# Patient Record
Sex: MALE | Race: WHITE | NOT HISPANIC OR LATINO | URBAN - METROPOLITAN AREA
[De-identification: names, ages, dates, MRNs, and addresses within clinical notes are randomized per-mention and may not be internally consistent; named-entity substitution may affect disease eponyms.]

---

## 2018-12-23 ENCOUNTER — EMERGENCY (EMERGENCY)
Facility: HOSPITAL | Age: 58
LOS: 1 days | Discharge: HOME | End: 2018-12-23
Attending: EMERGENCY MEDICINE
Payer: COMMERCIAL

## 2018-12-23 VITALS
HEART RATE: 66 BPM | HEIGHT: 72 IN | DIASTOLIC BLOOD PRESSURE: 96 MMHG | OXYGEN SATURATION: 98 % | TEMPERATURE: 98 F | WEIGHT: 171.96 LBS | SYSTOLIC BLOOD PRESSURE: 160 MMHG | RESPIRATION RATE: 17 BRPM

## 2018-12-23 VITALS
TEMPERATURE: 98 F | DIASTOLIC BLOOD PRESSURE: 84 MMHG | HEART RATE: 62 BPM | OXYGEN SATURATION: 98 % | SYSTOLIC BLOOD PRESSURE: 124 MMHG | RESPIRATION RATE: 18 BRPM

## 2018-12-23 LAB
ALBUMIN SERPL ELPH-MCNC: 4.6 G/DL — SIGNIFICANT CHANGE UP (ref 3.3–5)
ALP SERPL-CCNC: 61 U/L — SIGNIFICANT CHANGE UP (ref 40–120)
ALT FLD-CCNC: 18 U/L — SIGNIFICANT CHANGE UP (ref 10–45)
ANION GAP SERPL CALC-SCNC: 11 MMOL/L — SIGNIFICANT CHANGE UP (ref 5–17)
AST SERPL-CCNC: 18 U/L — SIGNIFICANT CHANGE UP (ref 10–40)
BASOPHILS # BLD AUTO: 0 K/UL — SIGNIFICANT CHANGE UP (ref 0–0.2)
BASOPHILS NFR BLD AUTO: 0.7 % — SIGNIFICANT CHANGE UP (ref 0–2)
BILIRUB SERPL-MCNC: 0.4 MG/DL — SIGNIFICANT CHANGE UP (ref 0.2–1.2)
BUN SERPL-MCNC: 26 MG/DL — HIGH (ref 7–23)
CALCIUM SERPL-MCNC: 9.5 MG/DL — SIGNIFICANT CHANGE UP (ref 8.4–10.5)
CHLORIDE SERPL-SCNC: 102 MMOL/L — SIGNIFICANT CHANGE UP (ref 96–108)
CO2 SERPL-SCNC: 27 MMOL/L — SIGNIFICANT CHANGE UP (ref 22–31)
CREAT SERPL-MCNC: 0.95 MG/DL — SIGNIFICANT CHANGE UP (ref 0.5–1.3)
EOSINOPHIL # BLD AUTO: 0.1 K/UL — SIGNIFICANT CHANGE UP (ref 0–0.5)
EOSINOPHIL NFR BLD AUTO: 0.9 % — SIGNIFICANT CHANGE UP (ref 0–6)
ERYTHROCYTE [SEDIMENTATION RATE] IN BLOOD: 8 MM/HR — SIGNIFICANT CHANGE UP (ref 0–20)
GLUCOSE SERPL-MCNC: 109 MG/DL — HIGH (ref 70–99)
HCT VFR BLD CALC: 47.8 % — SIGNIFICANT CHANGE UP (ref 39–50)
HGB BLD-MCNC: 16.2 G/DL — SIGNIFICANT CHANGE UP (ref 13–17)
LYMPHOCYTES # BLD AUTO: 1.4 K/UL — SIGNIFICANT CHANGE UP (ref 1–3.3)
LYMPHOCYTES # BLD AUTO: 18.8 % — SIGNIFICANT CHANGE UP (ref 13–44)
MCHC RBC-ENTMCNC: 33.6 PG — SIGNIFICANT CHANGE UP (ref 27–34)
MCHC RBC-ENTMCNC: 34 GM/DL — SIGNIFICANT CHANGE UP (ref 32–36)
MCV RBC AUTO: 99 FL — SIGNIFICANT CHANGE UP (ref 80–100)
MONOCYTES # BLD AUTO: 0.7 K/UL — SIGNIFICANT CHANGE UP (ref 0–0.9)
MONOCYTES NFR BLD AUTO: 9.8 % — SIGNIFICANT CHANGE UP (ref 2–14)
NEUTROPHILS # BLD AUTO: 5 K/UL — SIGNIFICANT CHANGE UP (ref 1.8–7.4)
NEUTROPHILS NFR BLD AUTO: 69.8 % — SIGNIFICANT CHANGE UP (ref 43–77)
PLATELET # BLD AUTO: 240 K/UL — SIGNIFICANT CHANGE UP (ref 150–400)
POTASSIUM SERPL-MCNC: 3.7 MMOL/L — SIGNIFICANT CHANGE UP (ref 3.5–5.3)
POTASSIUM SERPL-SCNC: 3.7 MMOL/L — SIGNIFICANT CHANGE UP (ref 3.5–5.3)
PROT SERPL-MCNC: 7.3 G/DL — SIGNIFICANT CHANGE UP (ref 6–8.3)
RBC # BLD: 4.83 M/UL — SIGNIFICANT CHANGE UP (ref 4.2–5.8)
RBC # FLD: 11.1 % — SIGNIFICANT CHANGE UP (ref 10.3–14.5)
SODIUM SERPL-SCNC: 140 MMOL/L — SIGNIFICANT CHANGE UP (ref 135–145)
WBC # BLD: 7.2 K/UL — SIGNIFICANT CHANGE UP (ref 3.8–10.5)
WBC # FLD AUTO: 7.2 K/UL — SIGNIFICANT CHANGE UP (ref 3.8–10.5)

## 2018-12-23 PROCEDURE — 85027 COMPLETE CBC AUTOMATED: CPT

## 2018-12-23 PROCEDURE — 99284 EMERGENCY DEPT VISIT MOD MDM: CPT

## 2018-12-23 PROCEDURE — 86140 C-REACTIVE PROTEIN: CPT

## 2018-12-23 PROCEDURE — 73660 X-RAY EXAM OF TOE(S): CPT

## 2018-12-23 PROCEDURE — 80053 COMPREHEN METABOLIC PANEL: CPT

## 2018-12-23 PROCEDURE — 73660 X-RAY EXAM OF TOE(S): CPT | Mod: 26,LT

## 2018-12-23 PROCEDURE — 87070 CULTURE OTHR SPECIMN AEROBIC: CPT

## 2018-12-23 PROCEDURE — 85652 RBC SED RATE AUTOMATED: CPT

## 2018-12-23 RX ORDER — PIPERACILLIN AND TAZOBACTAM 4; .5 G/20ML; G/20ML
3.38 INJECTION, POWDER, LYOPHILIZED, FOR SOLUTION INTRAVENOUS ONCE
Qty: 0 | Refills: 0 | Status: DISCONTINUED | OUTPATIENT
Start: 2018-12-23 | End: 2018-12-23

## 2018-12-23 RX ORDER — VANCOMYCIN HCL 1 G
1000 VIAL (EA) INTRAVENOUS ONCE
Qty: 0 | Refills: 0 | Status: DISCONTINUED | OUTPATIENT
Start: 2018-12-23 | End: 2018-12-23

## 2018-12-23 RX ORDER — AZTREONAM 2 G
2 VIAL (EA) INJECTION
Qty: 20 | Refills: 0 | OUTPATIENT
Start: 2018-12-23 | End: 2018-12-27

## 2018-12-23 RX ORDER — CIPROFLOXACIN LACTATE 400MG/40ML
1 VIAL (ML) INTRAVENOUS
Qty: 10 | Refills: 0 | OUTPATIENT
Start: 2018-12-23 | End: 2018-12-27

## 2018-12-23 NOTE — CONSULT NOTE ADULT - SUBJECTIVE AND OBJECTIVE BOX
Patient is a 58y old  Male who presents with a chief complaint of L foot ulcer     HPI: 59yo M with PMH HTN presenting with L third toe infection x 3 days. Pt reports ulcer to left third toe (hammer toe) that was "shaved" by podiatrist and pt started on amoxicillin 3 days ago. Patient reports worsening redness of toe as well as purulent drainage, so went to  last night and had abx changed to clinda as well as given topical creams to apply. Pt reports overall his toe is improved from 3 days ago, however came to ED for evaluation because he still does not like the way toe looks and podiatrist is on vacation. Able to ambulate. Denies any toe pain, spreading redness, or numbness/tingling. Denies fever/chills, n/v, h/o DM.       PAST MEDICAL & SURGICAL HISTORY:  HTN (hypertension)      MEDICATIONS  (STANDING):    MEDICATIONS  (PRN):      Allergies    No Known Allergies    Intolerances        VITALS:    Vital Signs Last 24 Hrs  T(C): 36.6 (23 Dec 2018 19:46), Max: 36.9 (23 Dec 2018 17:35)  T(F): 97.9 (23 Dec 2018 19:46), Max: 98.4 (23 Dec 2018 17:35)  HR: 57 (23 Dec 2018 19:46) (57 - 66)  BP: 136/84 (23 Dec 2018 19:46) (136/84 - 160/96)  BP(mean): --  RR: 18 (23 Dec 2018 19:46) (17 - 18)  SpO2: 97% (23 Dec 2018 19:46) (97% - 98%)    LABS:                          16.2   7.2   )-----------( 240      ( 23 Dec 2018 18:54 )             47.8       12-23    140  |  102  |  26<H>  ----------------------------<  109<H>  3.7   |  27  |  0.95    Ca    9.5      23 Dec 2018 18:54    TPro  7.3  /  Alb  4.6  /  TBili  0.4  /  DBili  x   /  AST  18  /  ALT  18  /  AlkPhos  61  12-23      CAPILLARY BLOOD GLUCOSE              LOWER EXTREMITY PHYSICAL EXAM:    Vasular: DP 1/4, /PT 2/4, B/L, CFT < 3 seconds B/L, Temperature gradient wnl, B/L.   Neuro: Epicritic sensation intact to the level of toes, B/L.  Musculoskeletal/Ortho: cavus foot b/l, flexible hammertoes lesser digits b/l   Skin: no edema, no erythema, no acute signs of infection   Wound #1:   Location: distal tip 3rd toe Left foot  Size: 0.4 x 0.4 x 0.1  Depth: subQ   Wound bed: granular  Drainage: sanguinous  Odor: none   Periwound: hyperkeratotic   Etiology: pressure     RADIOLOGY & ADDITIONAL STUDIES:  < from: Xray Toes, Left Foot (12.23.18 @ 19:06) >    ******PRELIMINARY REPORT******    ******PRELIMINARY REPORT******          EXAM:  TOES(S) LEFT FOOT                            PROCEDURE DATE:  12/23/2018      ******PRELIMINARY REPORT******    ******PRELIMINARY REPORT******              INTERPRETATION:  Third digit distal phalynx soft tissue swelling, focal   osteopenia, and destructive erosion of the cortex and portions of the   medullary cavity. Findings compatible with osteomyelitis.              ******PRELIMINARY REPORT******    ******PRELIMINARY REPORT******          BEATRIZ BRYAN M.D., RADIOLOGY RESIDENT                      < end of copied text >

## 2018-12-23 NOTE — ED ADULT NURSE NOTE - OBJECTIVE STATEMENT
57 y/o male with PMH HTN presenting to ED for left foot 3rd toe discomfort x 2 days. Pt states " I see a podiatrist for this toe. It's a hammer toe and a frequent issue. They debrided it and put me on an antibiotic recently. On saturday, I went to urgent care and they gave me clindamycin. Since I started the antibiotics and creams, the toe looks better but it's still a little uncomfortable." Upon exam pt A&Ox3 gross neuro intact,  no difficulty speaking in complete sentences, toe pink,  cap refill <2 seconds, no loss in sensation +rom in all toes, +pedal pulses, not painful to touch. Pt denies chest pain, sob, ha, n/v/d, abdominal pain, f/c, urinary symptoms, hematuria. +ambulation

## 2018-12-23 NOTE — ED PROVIDER NOTE - SKIN WOUND TYPE
+ small 0.5cm ulcer to tip of left third toe with erythematous base, + minimal drainage, diffuse mild edema and erythema to third toe, no spreading redness or streaking to foot, no ankle edema, sensation intact, DP pulse 2+, + hypertrophic toe nails + small 0.5cm ulcer to tip of left third toe with erythematous base, + minimal drainage, diffuse mild edema and erythema to distal third toe, no spreading redness or streaking to foot, no warmth, no ankle edema, sensation intact, DP pulse 2+, + hypertrophic toe nails

## 2018-12-23 NOTE — ED PROVIDER NOTE - CARE PLAN
Principal Discharge DX:	Toe infection  Assessment and plan of treatment:	1. Rest and elevate affected area. Stay well hydrated.   Take Motrin 600mg every 8 hours as needed for pain. Take with food.  2. Take clindamycin to completion as prescribed.   3. Follow up with your podiatrist, Dr. Rojas, and primary care provider upon discharge.    4. Return to ER for any worsening redness, swelling, streaking (red lines), fever, chills, purulent drainage, vomiting, or any other concerning symptoms.

## 2018-12-23 NOTE — ED PROVIDER NOTE - MEDICAL DECISION MAKING DETAILS
57yo M with HTN and left 3rd toe infection for couple of days. Seen by podiatrist, started on amoxicillin which was changed by first med to clindamycin. No fever, no chills. Came to ER to be checked. Seen by podiatry in the ER, wound looks good, sed rate is 8, xray revealed small irregularity concerning for osteomyelitis. According to podiatry, patient can home to continue clindamycin and f/u with Dr. Rojas at scheduled appointment on 1/3/19. ZR

## 2018-12-23 NOTE — ED PROVIDER NOTE - OBJECTIVE STATEMENT
59yo M with PMH HTN presenting with L third toe infection x 3 days. Pt reports ulcer to left third toe that was "shaved" by podiatrist and pt started on amoxicillin 3 days ago. Patient reports worsening redness of toe as well as purulent drainage, so went to  last night and had abx changed to clinda as well as given topical creams to apply. Pt reports overall toe looks better than it did 3 days ago, however came to ED for evaluation because he does not like the way toe looks and podiatrist is on vacation. Able to ambulate. Denies any toe pain, spreading redness, or numbness/tingling. Denies fever/chills, 59yo M with PMH HTN presenting with L third toe infection x 3 days. Pt reports ulcer to left third toe that was "shaved" by podiatrist and pt started on amoxicillin 3 days ago. Patient reports worsening redness of toe as well as purulent drainage, so went to  last night and had abx changed to clinda as well as given topical creams to apply. Pt reports overall toe looks better than it did 3 days ago, however came to ED for evaluation because he does not like the way toe looks and podiatrist is on vacation. Able to ambulate. Denies any toe pain, spreading redness, or numbness/tingling. Denies fever/chills  podiatrist Samuel Rojas 59yo M with PMH HTN presenting with L third toe infection x 3 days. Pt reports ulcer to left third toe (hammer toe) that was "shaved" by podiatrist and pt started on amoxicillin 3 days ago. Patient reports worsening redness of toe as well as purulent drainage, so went to  last night and had abx changed to clinda as well as given topical creams to apply. Pt reports overall toe looks better than it did 3 days ago, however came to ED for evaluation because he does not like the way toe looks and podiatrist is on vacation. Able to ambulate. Denies any toe pain, spreading redness, or numbness/tingling. Denies fever/chills, n/v, h/o DM.     Podiatrist SUKH Rojas  podiatrist Samuel Rojas 57yo M with PMH HTN presenting with L third toe infection x 3 days. Pt reports ulcer to left third toe (hammer toe) that was "shaved" by podiatrist and pt started on amoxicillin 3 days ago. Patient reports worsening redness of toe as well as purulent drainage, so went to  last night and had abx changed to clinda as well as given topical creams to apply. Pt reports overall his toe is improved from 3 days ago, however came to ED for evaluation because he still does not like the way toe looks and podiatrist is on vacation. Able to ambulate. Denies any toe pain, spreading redness, or numbness/tingling. Denies fever/chills, n/v, h/o DM.     Podiatrist SUKH Rojas  podiatrist Samuel Rojas 59yo M with PMH HTN presenting with L third toe infection x 3 days. Pt reports ulcer to left third toe (hammer toe) that was "shaved" by podiatrist and pt started on amoxicillin 3 days ago. Patient reports worsening redness of toe as well as purulent drainage, so went to  last night and had abx changed to clinda as well as given topical creams to apply. Pt reports overall his toe is improved from 3 days ago, however came to ED for evaluation because he still does not like the way toe looks and podiatrist is on vacation. Able to ambulate. Denies any toe pain, spreading redness, or numbness/tingling. Denies fever/chills, n/v, h/o DM.     Podiatrist Hira Rojas  [facility]

## 2018-12-23 NOTE — ED ADULT NURSE NOTE - NSIMPLEMENTINTERV_GEN_ALL_ED
Implemented All Universal Safety Interventions:  [place] to call system. Call bell, personal items and telephone within reach. Instruct patient to call for assistance. Room bathroom lighting operational. Non-slip footwear when patient is off stretcher. Physically safe environment: no spills, clutter or unnecessary equipment. Stretcher in lowest position, wheels locked, appropriate side rails in place.

## 2018-12-23 NOTE — ED ADULT TRIAGE NOTE - CHIEF COMPLAINT QUOTE
left 3rd toe redness/swelling and drainage- patient seen by podiatrist (Dr. Hira Rojas) for 2 days ago

## 2018-12-23 NOTE — ED PROVIDER NOTE - NSFOLLOWUPINSTRUCTIONS_ED_ALL_ED_FT
1. Rest and elevate affected area. Stay well hydrated.   Take Motrin 600mg every 8 hours as needed for pain. Take with food.  2. Take clindamycin to completion as prescribed.   3. Follow up with your podiatrist, Dr. Rojas, and primary care provider upon discharge.    4. Return to ER for any worsening redness, swelling, streaking (red lines), fever, chills, purulent drainage, vomiting, or any other concerning symptoms.

## 2018-12-23 NOTE — CONSULT NOTE ADULT - ASSESSMENT
57 y/o Male w/ Left 3rd toe distal ulcer to subQ   -Pt seen and evaluated   -vitals stable, WBC 7.12, ESR 8, CRP pending   -excisional debridement of ulcer with #15 blade to subQ   -no purulence, no erythema, no acute signs of infection   -Wound cleaned with saline & cultured   -dressed w/ betadine + DSD   -dispensed surgical shoe   -wound does not probe to bone   -xray reviewed; wound is stable to subQ - pt does not need admission  -will perform outpatient workup for OM  -Rx Cipro x 1 week   -Instructed pt to continue taking course of Clinda   -Daily dressing changes w/ mupirocin + DSD   -follow up Next week 12/31 with Dr Genao at [facility] [address]. Call [phone] to schedule appointment   -discussed w/ attending who is in agreement

## 2018-12-23 NOTE — ED PROVIDER NOTE - PROGRESS NOTE DETAILS
Pt still pending podiatry eval and recs, podiatry aware of Xray concerning for OM. - Lizeth Freeman PA-C Patient evaluated by podiatry resident in ED and Xrays reviewed concerning for OM. Podiatry NOT concerned for OM at this time, and does not want further imaging. ESR is 8. Podiatry recommending that patient be d/c to continue clinda course and f/u at scheduled appointment with Dr. Rojas on 1/3/19. - Lizeth Freeman PA-C

## 2018-12-23 NOTE — ED PROVIDER NOTE - CARE PROVIDER_API CALL
Hira Rojas (TRUDI), Foot Surgery  [address] [place] [address]  Phone: [phone]  Fax: [phone]

## 2018-12-24 PROBLEM — Z00.00 ENCOUNTER FOR PREVENTIVE HEALTH EXAMINATION: Status: ACTIVE | Noted: 2018-12-24

## 2018-12-24 LAB — CRP SERPL-MCNC: <0.1 MG/DL — SIGNIFICANT CHANGE UP (ref 0–0.4)

## 2018-12-25 LAB
CULTURE RESULTS: SIGNIFICANT CHANGE UP
SPECIMEN SOURCE: SIGNIFICANT CHANGE UP

## 2018-12-31 ENCOUNTER — APPOINTMENT (OUTPATIENT)
Dept: WOUND CARE | Facility: CLINIC | Age: 58
End: 2018-12-31
Payer: COMMERCIAL

## 2018-12-31 DIAGNOSIS — Z86.79 PERSONAL HISTORY OF OTHER DISEASES OF THE CIRCULATORY SYSTEM: ICD-10-CM

## 2018-12-31 PROCEDURE — 99203 OFFICE O/P NEW LOW 30 MIN: CPT | Mod: 25

## 2018-12-31 PROCEDURE — 11042 DBRDMT SUBQ TIS 1ST 20SQCM/<: CPT

## 2019-01-07 ENCOUNTER — APPOINTMENT (OUTPATIENT)
Dept: WOUND CARE | Facility: CLINIC | Age: 59
End: 2019-01-07
Payer: COMMERCIAL

## 2019-01-07 VITALS — TEMPERATURE: 98.1 F | DIASTOLIC BLOOD PRESSURE: 92 MMHG | SYSTOLIC BLOOD PRESSURE: 153 MMHG | HEART RATE: 84 BPM

## 2019-01-07 DIAGNOSIS — Q66.7 CONGENITAL PES CAVUS: ICD-10-CM

## 2019-01-07 PROCEDURE — 99213 OFFICE O/P EST LOW 20 MIN: CPT

## 2019-01-07 RX ORDER — HYDRALAZINE/HYDROCHLOROTHIAZID 50 MG-50MG
50-50 CAPSULE ORAL TWICE DAILY
Refills: 0 | Status: ACTIVE | COMMUNITY

## 2019-01-07 RX ORDER — NIFEDIPINE 90 MG
TABLET, EXTENDED RELEASE ORAL DAILY
Refills: 0 | Status: ACTIVE | COMMUNITY

## 2019-01-07 RX ORDER — METOPROLOL TARTRATE AND HYDROCHLOROTHIAZIDE 50; 25 MG/1; MG/1
TABLET ORAL
Refills: 0 | Status: ACTIVE | COMMUNITY

## 2019-01-08 PROBLEM — I10 ESSENTIAL (PRIMARY) HYPERTENSION: Chronic | Status: ACTIVE | Noted: 2018-12-23

## 2019-01-14 ENCOUNTER — APPOINTMENT (OUTPATIENT)
Dept: WOUND CARE | Facility: CLINIC | Age: 59
End: 2019-01-14
Payer: COMMERCIAL

## 2019-01-14 VITALS — HEART RATE: 73 BPM | DIASTOLIC BLOOD PRESSURE: 105 MMHG | TEMPERATURE: 98 F | SYSTOLIC BLOOD PRESSURE: 162 MMHG

## 2019-01-14 PROBLEM — Z86.79 HISTORY OF HYPERTENSION: Status: RESOLVED | Noted: 2019-01-14 | Resolved: 2019-01-14

## 2019-01-14 PROCEDURE — 99213 OFFICE O/P EST LOW 20 MIN: CPT

## 2019-01-14 NOTE — ASSESSMENT
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 Rec EMG, NCV and neurology referral. \par - Excisional debridement of hyperkeratotic tissue performed with #15 blad down to subq. Silver nitrate applied to wound for hemostasis. Once hemostasis achieved, dressing applied to LEFT foot. Pt tolerated procedure well.\par - C/w daily dressing changes with mupirocin and bandaid\par - Can return to work \par - Will hold off on flexor tenotomy for now.\par - Metatarsal and L pad added to L orthotic \par - RTC in 1 week

## 2019-01-14 NOTE — HISTORY OF PRESENT ILLNESS
[FreeTextEntry1] : 58 year old male presents to clinic for follow-up on 3rd toe ulcer. Patient has returned to work and had follow up with the neurologist who scheduled MRI and EMG/NCV. Patient Denies fever/chills, n/v.  Using mupirocin and a Bandaid on the toe

## 2019-01-14 NOTE — PHYSICAL EXAM
[4 x 4] : 4 x 4  [de-identified] : Sharp excisional debridement of hyperkeratotic tissue exhibiting underlying ulceration with no acute signs of infection.  [FreeTextEntry1] : distal third toe.  [FreeTextEntry2] : 0.1 [FreeTextEntry3] : 0.2 [FreeTextEntry4] : 0 [de-identified] : Hyperkeratotic  [de-identified] : 100% [FreeTextEntry5] : #15 blade  [de-identified] : Mupriocin

## 2019-01-14 NOTE — ASSESSMENT
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 \par - pt to undergo EMG on Jan 22nd, currently following up with Neurology\par - Excisional debridement of hyperkeratotic tissue performed with #15 blad down to subq. Pt tolerated procedure well.\par - C/w daily dressing changes with mupirocin and bandaid\par - Metatarsal and L pad added to L orthotic \par - crest pad made for left foot to offload distal aspect of digits\par - RTC in 2 weeks

## 2019-01-14 NOTE — HISTORY OF PRESENT ILLNESS
[FreeTextEntry1] : 58 year old male presents to clinic for follow-up on 3rd toe infection. Pt was seen at [facility] 12/23. Pt reports ulcer to left third toe (hammer toe) that was "shaved" by podiatrist and pt started on amoxicillin. Patient was discharged from the ED, as no acute signs of infection was reported at that time.Xrays show. Third digit distal phalanx soft tissue swelling, focal osteopenia, and destructive erosion of the cortex and portions of the medullary cavity; Findings compatible with osteomyelitis.  Pt was discharged on 10 days of clindamycin and has been doing daily dressing changes of mupriocin. Pt has finished course of antibiotics. Denies any increase in drainage, or malodor. Says wound has been improving. Patient Denies fever/chills, n/v, h/o DM

## 2019-01-14 NOTE — PHYSICAL EXAM
[4 x 4] : 4 x 4  [de-identified] : Sharp excisional debridement of hyperkeratotic tissue exhibiting underlying ulceration with no acute signs of infection.  [FreeTextEntry1] : distal third toe.  [FreeTextEntry2] : 0.1 [FreeTextEntry3] : 0.2 [FreeTextEntry4] : 0 [de-identified] : Hyperkeratotic  [de-identified] : 100% [FreeTextEntry5] : #15 blade  [de-identified] : Mupriocin

## 2019-01-28 ENCOUNTER — APPOINTMENT (OUTPATIENT)
Dept: WOUND CARE | Facility: CLINIC | Age: 59
End: 2019-01-28
Payer: COMMERCIAL

## 2019-01-28 DIAGNOSIS — M20.42 OTHER HAMMER TOE(S) (ACQUIRED), LEFT FOOT: ICD-10-CM

## 2019-01-28 DIAGNOSIS — G60.9 HEREDITARY AND IDIOPATHIC NEUROPATHY, UNSPECIFIED: ICD-10-CM

## 2019-01-28 DIAGNOSIS — L97.522 NON-PRESSURE CHRONIC ULCER OF OTHER PART OF LEFT FOOT WITH FAT LAYER EXPOSED: ICD-10-CM

## 2019-01-28 PROCEDURE — 99213 OFFICE O/P EST LOW 20 MIN: CPT

## 2019-01-28 NOTE — PHYSICAL EXAM
[4 x 4] : 4 x 4  [de-identified] : Sharp excisional debridement of hyperkeratotic tissue ulceration is healed.  [FreeTextEntry1] : distal third toe healed  [de-identified] : Hyperkeratotic  [de-identified] : 100% [FreeTextEntry5] : #15 blade  [de-identified] : Left dorsal lateral 5th digit hyperkeratosis with pre ulcerative lesion. No open lesions or clinical signs of infection.  [FreeTextEntry7] : dorsal lateral 5th toe

## 2019-01-28 NOTE — HISTORY OF PRESENT ILLNESS
[FreeTextEntry1] : 58 year old male presents to clinic for follow-up on 3rd toe ulcer. Patient has returned to work and had follow up with the neurologist pt had MRI and  EMG/NCV and will receive the results this week. Patient Denies fever/chills, n/v.  Using mupirocin and a Band- aid on the toe

## 2019-01-28 NOTE — ASSESSMENT
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 \par - pt is following with Neurology underwent MRI and EMG awaiting test results. \par - Excisional debridement of hyperkeratotic tissue performed with #15 blade down to epidermis . Pt tolerated procedure well.\par - All wounds healed dressing changes no longer needed \par - Continue with crest past to offload distal aspect of digits\par - Pt to follow up in [place] [facility] in 3 weeks

## 2019-08-23 NOTE — ED PROVIDER NOTE - PLAN OF CARE
Consent Type: Consent 1 (Standard) 1. Rest and elevate affected area. Stay well hydrated.   Take Motrin 600mg every 8 hours as needed for pain. Take with food.  2. Take clindamycin to completion as prescribed.   3. Follow up with your podiatrist, Dr. Rojas, and primary care provider upon discharge.    4. Return to ER for any worsening redness, swelling, streaking (red lines), fever, chills, purulent drainage, vomiting, or any other concerning symptoms.